# Patient Record
Sex: MALE | Race: WHITE | NOT HISPANIC OR LATINO | Employment: FULL TIME | URBAN - METROPOLITAN AREA
[De-identification: names, ages, dates, MRNs, and addresses within clinical notes are randomized per-mention and may not be internally consistent; named-entity substitution may affect disease eponyms.]

---

## 2018-05-29 ENCOUNTER — OFFICE VISIT - HEALTHEAST (OUTPATIENT)
Dept: FAMILY MEDICINE | Facility: CLINIC | Age: 28
End: 2018-05-29

## 2018-05-29 DIAGNOSIS — R05.9 COUGH: ICD-10-CM

## 2018-05-29 DIAGNOSIS — J30.2 SEASONAL ALLERGIES: ICD-10-CM

## 2018-05-29 ASSESSMENT — MIFFLIN-ST. JEOR: SCORE: 2181.42

## 2021-05-17 ENCOUNTER — HOSPITAL ENCOUNTER (EMERGENCY)
Facility: CLINIC | Age: 31
Discharge: HOME | End: 2021-05-17
Attending: EMERGENCY MEDICINE | Admitting: EMERGENCY MEDICINE
Payer: COMMERCIAL

## 2021-05-17 ENCOUNTER — APPOINTMENT (OUTPATIENT)
Dept: MRI IMAGING | Facility: CLINIC | Age: 31
End: 2021-05-17
Attending: EMERGENCY MEDICINE
Payer: COMMERCIAL

## 2021-05-17 VITALS
TEMPERATURE: 96.9 F | RESPIRATION RATE: 16 BRPM | HEART RATE: 72 BPM | HEIGHT: 73 IN | OXYGEN SATURATION: 99 % | WEIGHT: 265 LBS | SYSTOLIC BLOOD PRESSURE: 145 MMHG | BODY MASS INDEX: 35.12 KG/M2 | DIASTOLIC BLOOD PRESSURE: 94 MMHG

## 2021-05-17 DIAGNOSIS — H47.10 OPTIC NERVE EDEMA: ICD-10-CM

## 2021-05-17 LAB
ANION GAP SERPL CALCULATED.3IONS-SCNC: 2 MMOL/L (ref 3–14)
BASOPHILS # BLD AUTO: 0 10E9/L (ref 0–0.2)
BASOPHILS NFR BLD AUTO: 0.4 %
BUN SERPL-MCNC: 11 MG/DL (ref 7–30)
CALCIUM SERPL-MCNC: 9.7 MG/DL (ref 8.5–10.1)
CHLORIDE SERPL-SCNC: 107 MMOL/L (ref 94–109)
CO2 SERPL-SCNC: 30 MMOL/L (ref 20–32)
CREAT SERPL-MCNC: 0.98 MG/DL (ref 0.66–1.25)
CRP SERPL-MCNC: 7.1 MG/L (ref 0–8)
DIFFERENTIAL METHOD BLD: NORMAL
EOSINOPHIL # BLD AUTO: 0.1 10E9/L (ref 0–0.7)
EOSINOPHIL NFR BLD AUTO: 1 %
ERYTHROCYTE [DISTWIDTH] IN BLOOD BY AUTOMATED COUNT: 12.7 % (ref 10–15)
GFR SERPL CREATININE-BSD FRML MDRD: >90 ML/MIN/{1.73_M2}
GLUCOSE SERPL-MCNC: 115 MG/DL (ref 70–99)
HCT VFR BLD AUTO: 45.9 % (ref 40–53)
HGB BLD-MCNC: 15.4 G/DL (ref 13.3–17.7)
IMM GRANULOCYTES # BLD: 0.1 10E9/L (ref 0–0.4)
IMM GRANULOCYTES NFR BLD: 0.5 %
LYMPHOCYTES # BLD AUTO: 1.9 10E9/L (ref 0.8–5.3)
LYMPHOCYTES NFR BLD AUTO: 18.3 %
MCH RBC QN AUTO: 29.4 PG (ref 26.5–33)
MCHC RBC AUTO-ENTMCNC: 33.6 G/DL (ref 31.5–36.5)
MCV RBC AUTO: 88 FL (ref 78–100)
MONOCYTES # BLD AUTO: 0.8 10E9/L (ref 0–1.3)
MONOCYTES NFR BLD AUTO: 7.3 %
NEUTROPHILS # BLD AUTO: 7.6 10E9/L (ref 1.6–8.3)
NEUTROPHILS NFR BLD AUTO: 72.5 %
NRBC # BLD AUTO: 0 10*3/UL
NRBC BLD AUTO-RTO: 0 /100
PLATELET # BLD AUTO: 236 10E9/L (ref 150–450)
POTASSIUM SERPL-SCNC: 3.7 MMOL/L (ref 3.4–5.3)
RBC # BLD AUTO: 5.23 10E12/L (ref 4.4–5.9)
SODIUM SERPL-SCNC: 139 MMOL/L (ref 133–144)
WBC # BLD AUTO: 10.5 10E9/L (ref 4–11)

## 2021-05-17 PROCEDURE — 85025 COMPLETE CBC W/AUTO DIFF WBC: CPT | Performed by: EMERGENCY MEDICINE

## 2021-05-17 PROCEDURE — 80048 BASIC METABOLIC PNL TOTAL CA: CPT | Performed by: EMERGENCY MEDICINE

## 2021-05-17 PROCEDURE — A9585 GADOBUTROL INJECTION: HCPCS | Performed by: EMERGENCY MEDICINE

## 2021-05-17 PROCEDURE — 70553 MRI BRAIN STEM W/O & W/DYE: CPT

## 2021-05-17 PROCEDURE — 70543 MRI ORBT/FAC/NCK W/O &W/DYE: CPT

## 2021-05-17 PROCEDURE — 86140 C-REACTIVE PROTEIN: CPT | Performed by: EMERGENCY MEDICINE

## 2021-05-17 PROCEDURE — 99285 EMERGENCY DEPT VISIT HI MDM: CPT | Mod: 25

## 2021-05-17 PROCEDURE — 255N000002 HC RX 255 OP 636: Performed by: EMERGENCY MEDICINE

## 2021-05-17 RX ORDER — GADOBUTROL 604.72 MG/ML
12 INJECTION INTRAVENOUS ONCE
Status: COMPLETED | OUTPATIENT
Start: 2021-05-17 | End: 2021-05-17

## 2021-05-17 RX ADMIN — GADOBUTROL 12 ML: 604.72 INJECTION INTRAVENOUS at 18:17

## 2021-05-17 ASSESSMENT — VISUAL ACUITY
OS: 20/15
OD: 20/150

## 2021-05-17 ASSESSMENT — ENCOUNTER SYMPTOMS
WEAKNESS: 0
SHORTNESS OF BREATH: 0
NUMBNESS: 0
FACIAL ASYMMETRY: 0
SPEECH DIFFICULTY: 0

## 2021-05-17 ASSESSMENT — MIFFLIN-ST. JEOR: SCORE: 2210.91

## 2021-05-17 NOTE — ED PROVIDER NOTES
"  History   Chief Complaint:  Eye Problem       HPI   Bob Gilbert is a 30 year old male with a history of macular edema in the left eye who presents for evaluation of an eye problem. On 5/12/2021 the patient started to develop blurred vision in his right eye. He was seen in his ophthalmology clinic earlier today where he had an exam concerning for optic nerve edema and he was referred to the ED with concern that an MRI of the brain and orbits will need to be performed. He denies any numbness, weakness, speech difficulties, facial asymmetry, chest pain, or shortness of breath. He denies any history of kidney disease. His ophthalmologist is Dr. Anjum De of the [facility] in [place].      Review of Systems   Eyes: Positive for visual disturbance (blurred vision right eye).   Respiratory: Negative for shortness of breath.    Cardiovascular: Negative for chest pain.   Neurological: Negative for facial asymmetry, speech difficulty, weakness and numbness.   All other systems reviewed and are negative.      Allergies:  No known drug allergies      Medications:  The patient is not currently taking any prescribed medications.     Past Medical History:    Macular edema, left eye       Past Surgical History:    Tonsillectomy and adenoidectomy  [place] tooth extraction      Family History:    Hypertension - Mother     Social History:  The patient presents to the ED alone.   Ophthalmologist: Dr. Anjum De ([phone])    Physical Exam     Patient Vitals for the past 24 hrs:   BP Temp Temp src Pulse Resp SpO2 Height Weight   05/17/21 1602 (!) 145/94 96.9  F (36.1  C) Temporal 72 16 99 % 1.854 m (6' 1\") 120.2 kg (265 lb)       Physical Exam  Nursing note and vitals reviewed.    Constitutional:  Appears comfortable.    HENT:    Nose normal.  No discharge.      Oral mucosa is moist.  Eyes:    Conjunctivae are normal without injection.     Pupils are equal. Pupils dilated. EOMI.   Cardiovascular:  Normal " rate, regular rhythm with normal S1 and S2.      Normal heart sounds and peripheral pulses 2+ and equal.       No murmur or rajendra.  Pulmonary:  Effort normal and breath sounds clear to auscultation bilaterally.     No respiratory distress.  No stridor.     No wheezes. No rales.     Musculoskeletal:  Normal range of motion. No extremity deformity.    Neurological:   Alert and oriented. No focal weakness.  Some decreased visual acuity in the right eye.     Exhibits good muscle tone. Coordination normal.      Equal  strength and lower extremity strength. No facial droop.      GCS eye subscore is 4. GCS verbal subscore is 5.      GCS motor subscore is 6.   Skin:    Skin is warm and dry. No rash noted. No diaphoresis.   Psychiatric:   Behavior is normal. Appropriate mood and affect.     Judgment and thought content normal.     Emergency Department Course     Imaging:  MR Orbit w/o & w Contrast:  IMPRESSION:   1. Unremarkable MRI of the brain without evidence of acute infarct, mass, hemorrhage or herniation.   2. Normal MRI of the orbits.   Per radiology.      MR Brain w/o & w Contrast:  IMPRESSION:   1. Unremarkable MRI of the brain without evidence of acute infarct, mass, hemorrhage or herniation.   2. Normal MRI of the orbits.   Per radiology.      Laboratory:   CBC: WBC 10.5, HGB 15.4,    BMP: Anion gap 2 low, Glucose 115 high, o/w WNL (Creatinine 0.98)   CRP inflammation: 7.1       Emergency Department Course:  Reviewed:  I reviewed nursing notes, vitals and past medical history    Assessments:  1638: I obtained history and examined the patient as noted above.     1950: I updated and reassessed the patient.     2003: I updated and reassessed the patient.     Consults:   1959: I spoke with Dr. De of the ophthalmology service regarding patient's presentation, findings, and plan of care.      Disposition:  The patient was discharged to home.      Impression & Plan     Medical Decision Making:  Patient  comes in for MRI of the brain and orbits.  The patient had developed right eye blurry vision was seen by Dr. De who noticed a edematous optic nerve on the right.  The MRIs came back normal.  I talked with Dr. De again and he will call the patient tomorrow with further recommendations to see a neuro-ophthalmologist.  I did get some blood work here and his CRP is normal, CBC and basic panel all normal.  I relayed the findings to the patient and the recommendations and he will await Dr. De's call tomorrow.    Discharge Instructions:  Dr. De will be calling you tomorrow for further recommendations.  Caution with driving until your vision is back to normal.     Diagnosis:    ICD-10-CM    1. 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      Scribe Disclosure:  I, Loco Bridges, am serving as a scribe at 4:38 PM on 5/17/2021 to document services personally performed by Vee Whitaker MD based on my observations and the provider's statements to me.         Vee Whitaker MD  05/17/21 2038

## 2021-05-17 NOTE — ED NOTES
At eye doctor today, stated he has a optic nerve swelling in right eye. Told to come for MRI. R: 20/160 L :20/16. Hx macular edema in L eye.

## 2021-05-17 NOTE — ED TRIAGE NOTES
Blurry vision started Wednesday in right eye; vision wasn't improving prompting a visit to the eye doctor and sent to ED for MRI.  H/o left eye macular edema, resolved with time.  Optometrist concerned about optic nerve edema in right eye requesting:  MRI of brain and orbits w/ and w/out contrast.      Anjum De, [facility] [phone].

## 2021-05-18 ENCOUNTER — HOSPITAL ENCOUNTER (OUTPATIENT)
Dept: LAB | Facility: CLINIC | Age: 31
Discharge: HOME | End: 2021-05-18
Attending: STUDENT IN AN ORGANIZED HEALTH CARE EDUCATION/TRAINING PROGRAM | Admitting: STUDENT IN AN ORGANIZED HEALTH CARE EDUCATION/TRAINING PROGRAM
Payer: COMMERCIAL

## 2021-05-18 DIAGNOSIS — H30.90 NEURORETINITIS, UNSPECIFIED LATERALITY: Primary | ICD-10-CM

## 2021-05-18 DIAGNOSIS — H30.90 NEURORETINITIS, UNSPECIFIED LATERALITY: ICD-10-CM

## 2021-05-18 PROCEDURE — 36415 COLL VENOUS BLD VENIPUNCTURE: CPT | Performed by: STUDENT IN AN ORGANIZED HEALTH CARE EDUCATION/TRAINING PROGRAM

## 2021-05-18 PROCEDURE — 86611 BARTONELLA ANTIBODY: CPT | Performed by: STUDENT IN AN ORGANIZED HEALTH CARE EDUCATION/TRAINING PROGRAM

## 2021-05-18 PROCEDURE — 86481 TB AG RESPONSE T-CELL SUSP: CPT | Performed by: STUDENT IN AN ORGANIZED HEALTH CARE EDUCATION/TRAINING PROGRAM

## 2021-05-18 PROCEDURE — 86778 TOXOPLASMA ANTIBODY IGM: CPT | Performed by: STUDENT IN AN ORGANIZED HEALTH CARE EDUCATION/TRAINING PROGRAM

## 2021-05-18 PROCEDURE — 86777 TOXOPLASMA ANTIBODY: CPT | Performed by: STUDENT IN AN ORGANIZED HEALTH CARE EDUCATION/TRAINING PROGRAM

## 2021-05-18 PROCEDURE — 86618 LYME DISEASE ANTIBODY: CPT | Performed by: STUDENT IN AN ORGANIZED HEALTH CARE EDUCATION/TRAINING PROGRAM

## 2021-05-18 PROCEDURE — 86780 TREPONEMA PALLIDUM: CPT | Performed by: STUDENT IN AN ORGANIZED HEALTH CARE EDUCATION/TRAINING PROGRAM

## 2021-05-18 NOTE — DISCHARGE INSTRUCTIONS
Dr. De will be calling you tomorrow for further recommendations.  Caution with driving until your vision is back to normal.

## 2021-05-19 LAB
B BURGDOR IGG+IGM SER QL: 0.42 (ref 0–0.89)
T PALLIDUM AB SER QL: NONREACTIVE

## 2021-05-20 LAB
GAMMA INTERFERON BACKGROUND BLD IA-ACNC: 0.07 IU/ML
M TB IFN-G CD4+ BCKGRND COR BLD-ACNC: 9.93 IU/ML
M TB TUBERC IFN-G BLD QL: NEGATIVE
MITOGEN IGNF BCKGRD COR BLD-ACNC: 0 IU/ML
MITOGEN IGNF BCKGRD COR BLD-ACNC: 0.02 IU/ML
T GONDII IGG SER-ACNC: <3 IU/ML
T GONDII IGM SER-ACNC: <3 AU/ML

## 2021-05-24 LAB
B HENSELAE IGG TITR SER IF: NORMAL {TITER}
B HENSELAE IGM TITR SER IF: NORMAL {TITER}

## 2021-06-01 VITALS — BODY MASS INDEX: 35.49 KG/M2 | WEIGHT: 262 LBS | HEIGHT: 72 IN

## 2021-06-18 NOTE — PROGRESS NOTES
Assessment/Plan:         1. Seasonal allergies     2. Cough       Patient presenting with a history of seasonal allergies and a cough for 4 days.  At this time I believe his cough is secondary to seasonal allergies and the poor air quality we had over the weekend due to the high heat index and humidity.  I discussed with the patient the impact of this on allergies and encouraged him to increase his Allegra for a couple days to twice daily and to avoid allergen triggers.  At this time without other symptoms present I do not believe that his cough is due to a viral infection or bacterial infection however this certainly could develop.  I did recommend that he follow back up in clinic if symptoms are worsening or persisting for greater than 10 days.  He is in agreement this plan.    Subjective:      Bob Gilbert is a 27 y.o. male who has a new patient presents for a cough of 4 days. He denies any nasal congestion or drainage. No headaches have been present. Cough is worse at night. No specific shortness of breath. He has a history of seasonal allergies and takes a allegra for this daily. He does not use any nasal steroids. He denies any wheezing. No worsening symptoms with exertion. He has not had any fevers, body aches, or sore throat. No other significant concerns are present today.        The following portions of the patient's history were reviewed and updated as appropriate: allergies, current medications, past family history, past medical history, past social history, past surgical history and problem list.    History reviewed. No pertinent past medical history.  Past Surgical History:   Procedure Laterality Date     TONSILLECTOMY AND ADENOIDECTOMY       [place] TOOTH EXTRACTION       Social History     Social History     Marital status: Single     Spouse name: N/A     Number of children: N/A     Years of education: N/A     Occupational History     Not on file.     Social History Main Topics     Smoking  status: Never Smoker     Smokeless tobacco: Never Used     Alcohol use Not on file     Drug use: Not on file     Sexual activity: Not on file     Other Topics Concern     Not on file     Social History Narrative     No narrative on file     Current Outpatient Prescriptions   Medication Sig     fexofenadine (ALLEGRA) 180 MG tablet Take 180 mg by mouth daily.     Family History   Problem Relation Age of Onset     Hypertension Mother      Breast cancer Neg Hx      Colon cancer Neg Hx      Diabetes Neg Hx          Review of Systems   Pertinent items are noted in HPI.      Objective:      /88  Pulse 68  Ht 6' (1.829 m)  Wt (!) 262 lb (118.8 kg)  SpO2 97%  BMI 35.53 kg/m2    General Appearance: Alert, cooperative, appears slightly fatigued.  Head: Normocephalic, without obvious abnormality, atraumatic.  Eyes: PERRL, conjunctiva/corneas clear, EOM's intact.  Ears: Normal TM's and external ear canals, both ears.  Nose: Nares WNL  Throat: Slightly erythematous. No exudates. No tonsillar hypertrophy.  Neck: Supple, symmetrical, trachea midline, no adenopathy.  Heart : normal rate, regular rhythm, normal S1, S2, no murmurs, rubs, clicks or gallops.  Lungs: Clear to auscultation bilaterally, respirations unlabored.  Extremities: Extremities normal, atraumatic, no cyanosis or edema.  Lymph nodes: Cervical, supraclavicular, and axillary nodes normal.      No results found for this or any previous visit (from the past 168 hour(s)).

## 2021-06-20 ENCOUNTER — HEALTH MAINTENANCE LETTER (OUTPATIENT)
Age: 31
End: 2021-06-20

## 2021-10-11 ENCOUNTER — HEALTH MAINTENANCE LETTER (OUTPATIENT)
Age: 31
End: 2021-10-11

## 2022-07-17 ENCOUNTER — HEALTH MAINTENANCE LETTER (OUTPATIENT)
Age: 32
End: 2022-07-17

## 2022-09-25 ENCOUNTER — HEALTH MAINTENANCE LETTER (OUTPATIENT)
Age: 32
End: 2022-09-25

## 2023-08-05 ENCOUNTER — HEALTH MAINTENANCE LETTER (OUTPATIENT)
Age: 33
End: 2023-08-05